# Patient Record
Sex: FEMALE | Race: WHITE | NOT HISPANIC OR LATINO | URBAN - METROPOLITAN AREA
[De-identification: names, ages, dates, MRNs, and addresses within clinical notes are randomized per-mention and may not be internally consistent; named-entity substitution may affect disease eponyms.]

---

## 2018-09-12 ENCOUNTER — OFFICE (OUTPATIENT)
Dept: URBAN - METROPOLITAN AREA CLINIC 43 | Facility: CLINIC | Age: 73
End: 2018-09-12

## 2018-09-12 VITALS — WEIGHT: 109 LBS | SYSTOLIC BLOOD PRESSURE: 130 MMHG | DIASTOLIC BLOOD PRESSURE: 76 MMHG | HEIGHT: 62 IN

## 2018-09-12 DIAGNOSIS — K51.00 ULCERATIVE COLITIS, PANCOLITIS: ICD-10-CM

## 2018-09-12 PROCEDURE — 99204 OFFICE O/P NEW MOD 45 MIN: CPT | Performed by: INTERNAL MEDICINE

## 2018-09-12 NOTE — SERVICEHPINOTES
I had the pleasure of seeing this patient today.As you know, she is a 72-year-old woman diagnosed in 2011 with ulcerative colitis. She had multiple colonoscopies prior to that because of a family history of colon cancer in her mother, which had not shown anything, but in 2011 she was found to have severe pancolitis. She was placed on Lialda therapy and with this her symptoms have dramatically improved. She had colonoscopies in 2015 and in 2016, with biopsies available to me that reveal active colitis, but the description at least in 2016 was of quiescent colitis. Her last colonoscopy was in 2017 and at that time no evidence for active disease was seen although there was scarring throughout the [place] present. Biopsies were taken then but I do not have those results.In addition, the patient has dysphagia. She had an upper endoscopy several years ago the photographs of which I reviewed which seem to show a mild stricture, but she was not having much dysphagia back then. She also has a history of acid reflux.Currently, she has to fully formed bowel movements each day without any bleeding. She does get occasional lower abdominal discomfort, and she has increased gas, but otherwise has no other symptoms.She denies any nausea, vomiting, hematemesis, melena, hematochezia or unintentional weight loss.

## 2018-12-07 ENCOUNTER — AMBULATORY SURGICAL CENTER (OUTPATIENT)
Dept: URBAN - METROPOLITAN AREA SURGERY 22 | Facility: SURGERY | Age: 73
End: 2018-12-07

## 2018-12-07 VITALS
HEIGHT: 62 IN | WEIGHT: 105 LBS | HEART RATE: 73 BPM | OXYGEN SATURATION: 94 % | SYSTOLIC BLOOD PRESSURE: 128 MMHG | DIASTOLIC BLOOD PRESSURE: 96 MMHG | RESPIRATION RATE: 18 BRPM | TEMPERATURE: 98.1 F

## 2018-12-07 DIAGNOSIS — K22.2 ESOPHAGEAL OBSTRUCTION: ICD-10-CM

## 2018-12-07 DIAGNOSIS — K51.00 ULCERATIVE (CHRONIC) PANCOLITIS WITHOUT COMPLICATIONS: ICD-10-CM

## 2018-12-07 PROBLEM — K29.70 GASTRITIS, UNSPECIFIED, WITHOUT BLEEDING: Status: ACTIVE | Noted: 2018-12-07

## 2018-12-07 PROBLEM — K52.9 INFLAMMATORY BOWEL DISEASE OF THE INTESTINE IF MORE PRECISE DIAGNOSIS OR DETERMINATION OF THE EXTENT/SEVERITY OF ACTIVITY OF DISEASE WILL INFLUENCE IMMEDIATE/FUTURE MANAGEMENT: Status: ACTIVE | Noted: 2018-12-07

## 2018-12-07 PROCEDURE — 43249 ESOPH EGD DILATION <30 MM: CPT | Performed by: INTERNAL MEDICINE

## 2018-12-07 PROCEDURE — 43239 EGD BIOPSY SINGLE/MULTIPLE: CPT | Mod: 59 | Performed by: INTERNAL MEDICINE

## 2018-12-07 PROCEDURE — 45380 COLONOSCOPY AND BIOPSY: CPT | Performed by: INTERNAL MEDICINE

## 2019-06-19 ENCOUNTER — OFFICE (OUTPATIENT)
Dept: URBAN - METROPOLITAN AREA CLINIC 43 | Facility: CLINIC | Age: 74
End: 2019-06-19

## 2019-06-19 VITALS — SYSTOLIC BLOOD PRESSURE: 110 MMHG | DIASTOLIC BLOOD PRESSURE: 52 MMHG | WEIGHT: 109 LBS | HEIGHT: 62 IN

## 2019-06-19 DIAGNOSIS — K62.5 RECTAL BLEEDING: ICD-10-CM

## 2019-06-19 DIAGNOSIS — R13.10 DYSPHAGIA: ICD-10-CM

## 2019-06-19 DIAGNOSIS — K51.00 ULCERATIVE COLITIS, PANCOLITIS: ICD-10-CM

## 2019-06-19 PROCEDURE — 99214 OFFICE O/P EST MOD 30 MIN: CPT | Performed by: INTERNAL MEDICINE

## 2019-06-19 RX ORDER — HYDROCORTISONE 25 MG/G
5 CREAM TOPICAL
Qty: 60 | Status: ACTIVE
Start: 2019-06-19

## 2019-06-19 NOTE — SERVICEHPINOTES
I had the pleasure of seeing this patient today.As you know, she is a 73-year-old woman with a long history of rectal bleeding. She does have a history of pancolitis in the past, but her most recent colonoscopy done in December 2018 revealed no evidence for dysplasia or active disease. She has been having rectal bleeding on and off though and the blood is for the most part bright red although occasionally is dark maroon. It typically lasts for a day or so, but can last for up to 3 days. She has no other symptoms associated with this, no urgency, and no diarrhea.She does continue to complain of dysphagia as well. At the time of her last colonoscopy she also had an upper endoscopy done with a dilatation of the GE junction to 20 mm. She really did not get much relief from this however, and in talking to her more closely it appears that the problem really is a sensation in the back of her throat, not truly dysphagia with esophageal problems.She denies any nausea, vomiting, hematemesis, melena, or unintentional weight loss.

## 2022-05-23 ENCOUNTER — APPOINTMENT (RX ONLY)
Dept: URBAN - METROPOLITAN AREA CLINIC 47 | Facility: CLINIC | Age: 77
Setting detail: DERMATOLOGY
End: 2022-05-23

## 2022-05-23 DIAGNOSIS — L82.0 INFLAMED SEBORRHEIC KERATOSIS: ICD-10-CM

## 2022-05-23 DIAGNOSIS — L81.4 OTHER MELANIN HYPERPIGMENTATION: ICD-10-CM

## 2022-05-23 DIAGNOSIS — D22 MELANOCYTIC NEVI: ICD-10-CM

## 2022-05-23 DIAGNOSIS — K13.0 DISEASES OF LIPS: ICD-10-CM

## 2022-05-23 PROBLEM — D22.9 MELANOCYTIC NEVI, UNSPECIFIED: Status: ACTIVE | Noted: 2022-05-23

## 2022-05-23 PROCEDURE — 17110 DESTRUCTION B9 LES UP TO 14: CPT

## 2022-05-23 PROCEDURE — ? COUNSELING

## 2022-05-23 PROCEDURE — 99203 OFFICE O/P NEW LOW 30 MIN: CPT | Mod: 25

## 2022-05-23 PROCEDURE — ? PRESCRIPTION

## 2022-05-23 PROCEDURE — ? LIQUID NITROGEN

## 2022-05-23 RX ORDER — KETOCONAZOLE 20 MG/G
CREAM TOPICAL BID
Qty: 1 | Refills: 0 | Status: ERX | COMMUNITY
Start: 2022-05-23

## 2022-05-23 RX ADMIN — KETOCONAZOLE: 20 CREAM TOPICAL at 00:00

## 2022-05-23 ASSESSMENT — LOCATION SIMPLE DESCRIPTION DERM: LOCATION SIMPLE: UPPER BACK

## 2022-05-23 ASSESSMENT — LOCATION DETAILED DESCRIPTION DERM: LOCATION DETAILED: INFERIOR THORACIC SPINE

## 2022-05-23 ASSESSMENT — LOCATION ZONE DERM: LOCATION ZONE: TRUNK

## 2022-05-23 NOTE — PROCEDURE: LIQUID NITROGEN
Spray Paint Technique: No
Consent: The patient's consent was obtained including but not limited to risks of crusting, scabbing, blistering, scarring, darker or lighter pigmentary change, recurrence, incomplete removal and infection.
Post-Care Instructions: I reviewed with the patient in detail post-care instructions. Patient is to wear sunprotection, and avoid picking at any of the treated lesions. Pt may apply Vaseline to crusted or scabbing areas.
Render Post Care In The Note?: yes
Medical Necessity Information: It is in your best interest to select a reason for this procedure from the list below. All of these items fulfill various CMS LCD requirements except the new and changing color options.
Spray Paint Text: The liquid nitrogen was applied to the skin utilizing a spray paint frosting technique.
Detail Level: Zone
Total Number Of Lesions Treated: 4
Duration Of Freeze Thaw-Cycle (Seconds): 5
Medical Necessity Clause: This procedure was medically necessary because the lesions that were treated were: new
Number Of Freeze-Thaw Cycles: 1 freeze-thaw cycle

## 2023-05-09 NOTE — PROCEDURE: COUNSELING
No
Patient Specific Counseling (Will Not Stick From Patient To Patient): Lip corners
Detail Level: Zone
Detail Level: Detailed